# Patient Record
Sex: MALE | Race: OTHER
[De-identification: names, ages, dates, MRNs, and addresses within clinical notes are randomized per-mention and may not be internally consistent; named-entity substitution may affect disease eponyms.]

---

## 2020-02-24 ENCOUNTER — HOSPITAL ENCOUNTER (EMERGENCY)
Dept: HOSPITAL 54 - ER | Age: 25
Discharge: HOME | End: 2020-02-24
Payer: COMMERCIAL

## 2020-02-24 VITALS — SYSTOLIC BLOOD PRESSURE: 118 MMHG | DIASTOLIC BLOOD PRESSURE: 81 MMHG

## 2020-02-24 VITALS — BODY MASS INDEX: 19.18 KG/M2 | HEIGHT: 70 IN | WEIGHT: 134 LBS
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
## 2020-02-24 DIAGNOSIS — S92.311A: Primary | ICD-10-CM
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
## 2020-02-24 NOTE — NUR
patient cleared to be discharged. discharge instructions and education provided 
and patient verbalized understanding. patient verbalized that somebody will 
come and pick him up. lunch provided. will continue to monitor

## 2020-02-24 NOTE — NUR
Patient arrived at unit with c/o right foot pain and swelling s/p wood pallet 
fell on his foot, 10/10 pain scale. accompanied to bed. will continue to 
monitor